# Patient Record
Sex: FEMALE | Race: OTHER | Employment: OTHER | URBAN - METROPOLITAN AREA
[De-identification: names, ages, dates, MRNs, and addresses within clinical notes are randomized per-mention and may not be internally consistent; named-entity substitution may affect disease eponyms.]

---

## 2020-12-15 NOTE — PATIENT DISCUSSION
Patient presents w/ a small chalazion on the left lower eyelid margin. Discussed the r/b of excision w/ the patient in detail today. Explained that in its current state the chalazion will not spontaneously heal on its own. Also expressed that the chalazion is not likely to worsen over time. Patient expressed understanding and wishes to forgo excision at this time. Follow up for excision at her convenience.

## 2022-01-24 ENCOUNTER — NEW PATIENT (OUTPATIENT)
Dept: URBAN - METROPOLITAN AREA CLINIC 32 | Facility: CLINIC | Age: 87
End: 2022-01-24

## 2022-01-24 DIAGNOSIS — H53.8: ICD-10-CM

## 2022-01-24 PROCEDURE — 92015 DETERMINE REFRACTIVE STATE: CPT

## 2022-01-24 PROCEDURE — 92004 COMPRE OPH EXAM NEW PT 1/>: CPT

## 2022-01-24 ASSESSMENT — TONOMETRY
OS_IOP_MMHG: 16
OD_IOP_MMHG: 13

## 2022-01-24 ASSESSMENT — VISUAL ACUITY
OS_CC: 20/50+2
OD_CC: 20/25
OU_CC: J1

## 2022-08-03 ENCOUNTER — COMPREHENSIVE EXAM (OUTPATIENT)
Dept: URBAN - METROPOLITAN AREA CLINIC 32 | Facility: CLINIC | Age: 87
End: 2022-08-03

## 2022-08-03 DIAGNOSIS — H35.373: ICD-10-CM

## 2022-08-03 DIAGNOSIS — H43.812: ICD-10-CM

## 2022-08-03 DIAGNOSIS — H35.343: ICD-10-CM

## 2022-08-03 DIAGNOSIS — Z96.1: ICD-10-CM

## 2022-08-03 PROCEDURE — 99214 OFFICE O/P EST MOD 30 MIN: CPT

## 2022-08-03 PROCEDURE — 92134 CPTRZ OPH DX IMG PST SGM RTA: CPT

## 2022-08-03 ASSESSMENT — KERATOMETRY
OD_AXISANGLE2_DEGREES: 70
OD_K2POWER_DIOPTERS: 45.50
OS_K2POWER_DIOPTERS: 46.50
OD_AXISANGLE_DEGREES: 160
OS_K1POWER_DIOPTERS: 47.00
OD_K1POWER_DIOPTERS: 46.25
OS_AXISANGLE2_DEGREES: 160
OS_AXISANGLE_DEGREES: 70

## 2022-08-03 ASSESSMENT — VISUAL ACUITY
OS_CC: 20/60-2
OD_SC: 20/100-1
OD_CC: 20/60-2
OS_SC: 20/70

## 2022-08-03 ASSESSMENT — TONOMETRY
OS_IOP_MMHG: 15
OD_IOP_MMHG: 13

## 2024-02-12 ENCOUNTER — COMPREHENSIVE EXAM (OUTPATIENT)
Dept: URBAN - METROPOLITAN AREA CLINIC 32 | Facility: CLINIC | Age: 89
End: 2024-02-12

## 2024-02-12 DIAGNOSIS — H35.373: ICD-10-CM

## 2024-02-12 DIAGNOSIS — Z96.1: ICD-10-CM

## 2024-02-12 DIAGNOSIS — H43.813: ICD-10-CM

## 2024-02-12 DIAGNOSIS — H35.343: ICD-10-CM

## 2024-02-12 PROCEDURE — 92015 DETERMINE REFRACTIVE STATE: CPT

## 2024-02-12 PROCEDURE — 92134 CPTRZ OPH DX IMG PST SGM RTA: CPT

## 2024-02-12 PROCEDURE — 99214 OFFICE O/P EST MOD 30 MIN: CPT

## 2024-02-12 ASSESSMENT — KERATOMETRY
OD_K1POWER_DIOPTERS: 46.75
OD_AXISANGLE_DEGREES: 157
OS_K1POWER_DIOPTERS: 47.25
OS_K2POWER_DIOPTERS: 46.50
OS_AXISANGLE_DEGREES: 157
OD_AXISANGLE2_DEGREES: 67
OS_AXISANGLE2_DEGREES: 067
OD_K2POWER_DIOPTERS: 46.00

## 2024-02-12 ASSESSMENT — TONOMETRY
OD_IOP_MMHG: 12
OS_IOP_MMHG: 12

## 2024-02-12 ASSESSMENT — VISUAL ACUITY
OS_CC: 20/50+1
OU_CC: 20/30
OD_CC: J1
OS_CC: J2-1
OD_CC: 20/30-1

## 2025-02-20 ENCOUNTER — COMPREHENSIVE EXAM (OUTPATIENT)
Age: OVER 89
End: 2025-02-20

## 2025-02-20 DIAGNOSIS — H02.89: ICD-10-CM

## 2025-02-20 DIAGNOSIS — H43.813: ICD-10-CM

## 2025-02-20 DIAGNOSIS — H02.834: ICD-10-CM

## 2025-02-20 DIAGNOSIS — H35.343: ICD-10-CM

## 2025-02-20 DIAGNOSIS — H02.831: ICD-10-CM

## 2025-02-20 DIAGNOSIS — H35.373: ICD-10-CM

## 2025-02-20 PROCEDURE — 92134 CPTRZ OPH DX IMG PST SGM RTA: CPT

## 2025-02-20 PROCEDURE — 99214 OFFICE O/P EST MOD 30 MIN: CPT
